# Patient Record
Sex: FEMALE | Race: WHITE | NOT HISPANIC OR LATINO | Employment: FULL TIME | URBAN - METROPOLITAN AREA
[De-identification: names, ages, dates, MRNs, and addresses within clinical notes are randomized per-mention and may not be internally consistent; named-entity substitution may affect disease eponyms.]

---
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
## 2019-02-06 ENCOUNTER — OFFICE VISIT (OUTPATIENT)
Dept: INTERNAL MEDICINE | Facility: CLINIC | Age: 19
End: 2019-02-06

## 2019-02-06 VITALS
HEART RATE: 73 BPM | HEIGHT: 63 IN | BODY MASS INDEX: 26.72 KG/M2 | TEMPERATURE: 98 F | RESPIRATION RATE: 16 BRPM | SYSTOLIC BLOOD PRESSURE: 116 MMHG | WEIGHT: 150.8 LBS | OXYGEN SATURATION: 98 % | DIASTOLIC BLOOD PRESSURE: 72 MMHG

## 2019-02-06 DIAGNOSIS — R21 PAPULAR RASH, LOCALIZED: Primary | ICD-10-CM

## 2019-02-06 PROCEDURE — 99203 OFFICE O/P NEW LOW 30 MIN: CPT | Performed by: PHYSICIAN ASSISTANT

## 2019-02-06 RX ORDER — TRIAMCINOLONE ACETONIDE 1 MG/G
CREAM TOPICAL 2 TIMES DAILY
Qty: 15 G | Refills: 1 | Status: SHIPPED | OUTPATIENT
Start: 2019-02-06 | End: 2020-12-23

## 2019-02-06 NOTE — PROGRESS NOTES
Chief Complaint   Patient presents with   • Establish Care   • Rash     Pt is c/o bumps on the insides of her elbows. She states that they do itch. She states they also appear on the tops of her ankles as well.        Subjective   Karol Leal is a 18 y.o. female presents today to establish care.  She has c/o skin rash.    History of Present Illness    Skin Rash:  Patient reports history of rash to her elbows and ankles.  This has been occurring on and off for about 5 months.  She has used OTC moisturizers and cream, which will improve the rash temporarily.  There is associated pruritis with the rash.  She denies any new lotion, laundry soaps, or other new potential contact irritant.  She denies fever, chills, nausea, or vomiting.  She initially sought out dermatology evaluation but required a referral.     Past Medical History:   Diagnosis Date   • Anxiety    • Herpes simplex complication      Past Surgical History:   Procedure Laterality Date   • ADENOIDECTOMY     • TONSILLECTOMY     • TUBAL ABDOMINAL LIGATION       Family History   Problem Relation Age of Onset   • No Known Problems Mother      Social History     Socioeconomic History   • Marital status: Single     Spouse name: Not on file   • Number of children: Not on file   • Years of education: Not on file   • Highest education level: Not on file   Social Needs   • Financial resource strain: Not on file   • Food insecurity - worry: Not on file   • Food insecurity - inability: Not on file   • Transportation needs - medical: Not on file   • Transportation needs - non-medical: Not on file   Occupational History   • Not on file   Tobacco Use   • Smoking status: Never Smoker   • Smokeless tobacco: Never Used   Substance and Sexual Activity   • Alcohol use: No     Frequency: Never   • Drug use: No   • Sexual activity: Not on file   Other Topics Concern   • Not on file   Social History Narrative   • Not on file     No Known Allergies    Review of Systems    Constitutional: Negative for chills, fatigue and fever.   Respiratory: Negative for cough and shortness of breath.    Cardiovascular: Negative for chest pain and palpitations.   Gastrointestinal: Negative for nausea and vomiting.   Musculoskeletal: Negative for arthralgias, joint swelling and myalgias.   Skin: Positive for rash. Negative for color change.   Neurological: Negative for dizziness and light-headedness.   Hematological: Negative for adenopathy.     Objective     Vitals:    02/06/19 1433   BP: 116/72   Pulse: 73   Resp: 16   Temp: 98 °F (36.7 °C)   SpO2: 98%       Physical Exam   Constitutional: She is oriented to person, place, and time. She appears well-developed and well-nourished. No distress.   HENT:   Head: Normocephalic and atraumatic.   Eyes: Conjunctivae and EOM are normal. Pupils are equal, round, and reactive to light.   Neck: Normal range of motion. Neck supple.   Cardiovascular: Normal rate, regular rhythm and normal heart sounds. Exam reveals no gallop and no friction rub.   No murmur heard.  Pulmonary/Chest: Effort normal and breath sounds normal. No respiratory distress. She has no wheezes. She has no rales.   Neurological: She is alert and oriented to person, place, and time.   Skin: Skin is warm and dry. Capillary refill takes less than 2 seconds. Turgor is normal. Rash noted. Rash is papular. Rash is not macular. She is not diaphoretic.        Psychiatric: She has a normal mood and affect. Her behavior is normal.   Nursing note and vitals reviewed.      No results found for this or any previous visit.    Assessment/Plan     Karol was seen today for establish care and rash.    Diagnoses and all orders for this visit:    Papular rash, localized  -     Start steroid cream.  Can use benadryl for itching.  Will place referral to dermatology as requested.  Have advised patient to take photos of the rash before using steroid cream to show at dermatology referral.   -     triamcinolone  (KENALOG) 0.1 % cream; Apply  topically to the appropriate area as directed 2 (Two) Times a Day.  -     Ambulatory Referral to Dermatology    Return if symptoms worsen or fail to improve, for Annual.    Savita Carroll PA-C

## 2019-10-07 ENCOUNTER — TELEPHONE (OUTPATIENT)
Dept: INTERNAL MEDICINE | Facility: CLINIC | Age: 19
End: 2019-10-07

## 2019-10-07 NOTE — TELEPHONE ENCOUNTER
Patient called and states she had a nexplanon placed over 3 years ago in [place] and needs to have it removed. She is wondering if we will do this for her since we did not place it.

## 2020-11-15 ENCOUNTER — HOSPITAL ENCOUNTER (EMERGENCY)
Facility: HOSPITAL | Age: 20
Discharge: HOME | End: 2020-11-15
Attending: STUDENT IN AN ORGANIZED HEALTH CARE EDUCATION/TRAINING PROGRAM | Admitting: STUDENT IN AN ORGANIZED HEALTH CARE EDUCATION/TRAINING PROGRAM

## 2020-11-15 VITALS
WEIGHT: 153 LBS | DIASTOLIC BLOOD PRESSURE: 83 MMHG | OXYGEN SATURATION: 99 % | RESPIRATION RATE: 14 BRPM | HEART RATE: 90 BPM | BODY MASS INDEX: 26.12 KG/M2 | TEMPERATURE: 97.9 F | HEIGHT: 64 IN | SYSTOLIC BLOOD PRESSURE: 123 MMHG

## 2020-11-15 DIAGNOSIS — B96.89 BACTERIAL VAGINOSIS: Primary | ICD-10-CM

## 2020-11-15 DIAGNOSIS — R10.2 PELVIC PAIN: ICD-10-CM

## 2020-11-15 DIAGNOSIS — N76.0 BACTERIAL VAGINOSIS: Primary | ICD-10-CM

## 2020-11-15 LAB
ALBUMIN SERPL-MCNC: 4.4 G/DL (ref 3.5–5.2)
ALBUMIN/GLOB SERPL: 1.8 G/DL
ALP SERPL-CCNC: 112 U/L (ref 39–117)
ALT SERPL W P-5'-P-CCNC: 11 U/L (ref 1–33)
ANION GAP SERPL CALCULATED.3IONS-SCNC: 9.6 MMOL/L (ref 5–15)
AST SERPL-CCNC: 18 U/L (ref 1–32)
B-HCG UR QL: NEGATIVE
BASOPHILS # BLD AUTO: 0.06 10*3/MM3 (ref 0–0.2)
BASOPHILS NFR BLD AUTO: 0.6 % (ref 0–1.5)
BILIRUB SERPL-MCNC: 0.6 MG/DL (ref 0–1.2)
BILIRUB UR QL STRIP: NEGATIVE
BUN SERPL-MCNC: 16 MG/DL (ref 6–20)
BUN/CREAT SERPL: 23.9 (ref 7–25)
CALCIUM SPEC-SCNC: 9.5 MG/DL (ref 8.6–10.5)
CHLORIDE SERPL-SCNC: 105 MMOL/L (ref 98–107)
CLARITY UR: CLEAR
CLUE CELLS SPEC QL WET PREP: ABNORMAL
CO2 SERPL-SCNC: 23.4 MMOL/L (ref 22–29)
COLOR UR: YELLOW
CREAT SERPL-MCNC: 0.67 MG/DL (ref 0.57–1)
DEPRECATED RDW RBC AUTO: 42 FL (ref 37–54)
EOSINOPHIL # BLD AUTO: 0.15 10*3/MM3 (ref 0–0.4)
EOSINOPHIL NFR BLD AUTO: 1.5 % (ref 0.3–6.2)
ERYTHROCYTE [DISTWIDTH] IN BLOOD BY AUTOMATED COUNT: 12.7 % (ref 12.3–15.4)
GFR SERPL CREATININE-BSD FRML MDRD: 111 ML/MIN/1.73
GLOBULIN UR ELPH-MCNC: 2.4 GM/DL
GLUCOSE SERPL-MCNC: 83 MG/DL (ref 65–99)
GLUCOSE UR STRIP-MCNC: NEGATIVE MG/DL
HCT VFR BLD AUTO: 40.3 % (ref 34–46.6)
HGB BLD-MCNC: 13.2 G/DL (ref 12–15.9)
HGB UR QL STRIP.AUTO: NEGATIVE
HYDATID CYST SPEC WET PREP: ABNORMAL
IMM GRANULOCYTES # BLD AUTO: 0.03 10*3/MM3 (ref 0–0.05)
IMM GRANULOCYTES NFR BLD AUTO: 0.3 % (ref 0–0.5)
KETONES UR QL STRIP: NEGATIVE
KOH PREP NAIL: NORMAL
LEUKOCYTE ESTERASE UR QL STRIP.AUTO: NEGATIVE
LIPASE SERPL-CCNC: 27 U/L (ref 13–60)
LYMPHOCYTES # BLD AUTO: 4.25 10*3/MM3 (ref 0.7–3.1)
LYMPHOCYTES NFR BLD AUTO: 43.1 % (ref 19.6–45.3)
MCH RBC QN AUTO: 29.4 PG (ref 26.6–33)
MCHC RBC AUTO-ENTMCNC: 32.8 G/DL (ref 31.5–35.7)
MCV RBC AUTO: 89.8 FL (ref 79–97)
MONOCYTES # BLD AUTO: 1.05 10*3/MM3 (ref 0.1–0.9)
MONOCYTES NFR BLD AUTO: 10.6 % (ref 5–12)
NEUTROPHILS NFR BLD AUTO: 4.32 10*3/MM3 (ref 1.7–7)
NEUTROPHILS NFR BLD AUTO: 43.9 % (ref 42.7–76)
NITRITE UR QL STRIP: NEGATIVE
NRBC BLD AUTO-RTO: 0 /100 WBC (ref 0–0.2)
PH UR STRIP.AUTO: 6.5 [PH] (ref 5–8)
PLATELET # BLD AUTO: 267 10*3/MM3 (ref 140–450)
PMV BLD AUTO: 9.3 FL (ref 6–12)
POTASSIUM SERPL-SCNC: 3.9 MMOL/L (ref 3.5–5.2)
PROT SERPL-MCNC: 6.8 G/DL (ref 6–8.5)
PROT UR QL STRIP: NEGATIVE
RBC # BLD AUTO: 4.49 10*6/MM3 (ref 3.77–5.28)
SODIUM SERPL-SCNC: 138 MMOL/L (ref 136–145)
SP GR UR STRIP: 1.03 (ref 1–1.03)
T VAGINALIS SPEC QL WET PREP: ABNORMAL
UROBILINOGEN UR QL STRIP: NORMAL
WBC # BLD AUTO: 9.86 10*3/MM3 (ref 3.4–10.8)
WBC SPEC QL WET PREP: ABNORMAL
YEAST GENITAL QL WET PREP: ABNORMAL

## 2020-11-15 PROCEDURE — 81003 URINALYSIS AUTO W/O SCOPE: CPT

## 2020-11-15 PROCEDURE — 87220 TISSUE EXAM FOR FUNGI: CPT | Performed by: PHYSICIAN ASSISTANT

## 2020-11-15 PROCEDURE — 80053 COMPREHEN METABOLIC PANEL: CPT | Performed by: PHYSICIAN ASSISTANT

## 2020-11-15 PROCEDURE — 87491 CHLMYD TRACH DNA AMP PROBE: CPT | Performed by: PHYSICIAN ASSISTANT

## 2020-11-15 PROCEDURE — 81025 URINE PREGNANCY TEST: CPT

## 2020-11-15 PROCEDURE — 99284 EMERGENCY DEPT VISIT MOD MDM: CPT

## 2020-11-15 PROCEDURE — 87210 SMEAR WET MOUNT SALINE/INK: CPT | Performed by: PHYSICIAN ASSISTANT

## 2020-11-15 PROCEDURE — 85025 COMPLETE CBC W/AUTO DIFF WBC: CPT | Performed by: PHYSICIAN ASSISTANT

## 2020-11-15 PROCEDURE — 83690 ASSAY OF LIPASE: CPT | Performed by: PHYSICIAN ASSISTANT

## 2020-11-15 PROCEDURE — 87591 N.GONORRHOEAE DNA AMP PROB: CPT | Performed by: PHYSICIAN ASSISTANT

## 2020-11-15 RX ORDER — IBUPROFEN 800 MG/1
800 TABLET ORAL ONCE
Status: COMPLETED | OUTPATIENT
Start: 2020-11-15 | End: 2020-11-15

## 2020-11-15 RX ORDER — METRONIDAZOLE 500 MG/1
500 TABLET ORAL ONCE
Status: COMPLETED | OUTPATIENT
Start: 2020-11-15 | End: 2020-11-15

## 2020-11-15 RX ORDER — METRONIDAZOLE 500 MG/1
500 TABLET ORAL 2 TIMES DAILY
Qty: 14 TABLET | Refills: 0 | Status: SHIPPED | OUTPATIENT
Start: 2020-11-15 | End: 2020-11-22

## 2020-11-15 RX ORDER — IBUPROFEN 600 MG/1
600 TABLET ORAL EVERY 8 HOURS PRN
Qty: 15 TABLET | Refills: 0 | Status: SHIPPED | OUTPATIENT
Start: 2020-11-15 | End: 2020-11-20

## 2020-11-15 RX ORDER — ONDANSETRON 4 MG/1
4 TABLET, ORALLY DISINTEGRATING ORAL EVERY 6 HOURS PRN
Qty: 8 TABLET | Refills: 0 | Status: SHIPPED | OUTPATIENT
Start: 2020-11-15 | End: 2020-11-17

## 2020-11-15 RX ADMIN — METRONIDAZOLE 500 MG: 500 TABLET ORAL at 20:57

## 2020-11-15 RX ADMIN — IBUPROFEN 800 MG: 800 TABLET ORAL at 20:57

## 2020-11-16 NOTE — ED PROVIDER NOTES
Subjective   Patient is a 20-year-old female with a history of herpes simplex and anxiety presenting to the ER for evaluation of lower abdominal and pelvic pain.  Patient states the pain is been ongoing for approximately weeks.  She states that she had a yellow discharge that is abnormal for her.  She states she has had some itching in the area.  She states that she is currently sexually active she states she has had some dull pain in that area that comes and goes, thought she was about to start her menstrual cycle.  States she is felt a bit of nausea but denies any fever, emesis, diarrhea, dysuria, hematuria.  She states she used to see an OB/GYN at  but has recently become uninsured.  She denies any chest pain, cough, shortness of breath, recent antibiotic use, known sick contacts.          Review of Systems   Constitutional: Negative for chills and fever.   HENT: Negative.    Eyes: Negative.    Respiratory: Negative.    Cardiovascular: Negative.    Gastrointestinal: Positive for abdominal pain. Negative for diarrhea, nausea and vomiting.   Genitourinary: Positive for pelvic pain and vaginal discharge. Negative for dysuria, flank pain and hematuria.   Musculoskeletal: Negative.    Skin: Negative.    Allergic/Immunologic: Negative for immunocompromised state.   Neurological: Negative.    Psychiatric/Behavioral: Negative.        Past Medical History:   Diagnosis Date   • Anxiety    • Herpes simplex complication        No Known Allergies    Past Surgical History:   Procedure Laterality Date   • ADENOIDECTOMY     • TONSILLECTOMY     • TUBAL ABDOMINAL LIGATION         Family History   Problem Relation Age of Onset   • No Known Problems Mother        Social History     Socioeconomic History   • Marital status: Single     Spouse name: Not on file   • Number of children: Not on file   • Years of education: Not on file   • Highest education level: Not on file   Tobacco Use   • Smoking status: Never Smoker   • Smokeless  "tobacco: Never Used   Substance and Sexual Activity   • Alcohol use: No     Frequency: Never   • Drug use: No           Objective   Physical Exam  Vitals signs and nursing note reviewed.     /83   Pulse 90   Temp 97.9 °F (36.6 °C) (Oral)   Resp 14   Ht 162.6 cm (64\")   Wt 69.4 kg (153 lb)   LMP 10/25/2020   SpO2 99%   BMI 26.26 kg/m²     GEN: No acute distress, sitting upright in stretcher.  Awake and alert  Head: Normocephalic, atraumatic  Eyes: EOM intact  ENT: Mask in place per protocol   Cardiovascular: Regular rate and rhythm  Lungs: Clear to auscultation bilaterally  Abdomen: Abdomen is nontender, no focal guarding, rigidity, bowel sounds present.  : External genitalia unremarkable.  Patient did have some clear drainage from the cervix, no inflammation or erythema.  No other obvious lesions noted  Extremities: No edema, normal appearance  Neuro: GCS 15  Psych: Mood and affect are appropriate    Procedures           ED Course  ED Course as of Nov 16 0027   Sun Nov 15, 2020   1944 Pelvic exam chaperoned by patient care [facility]    [LA]   1955 Clue Cells, Wet Prep(!): 1+ Clue cells seen [LA]   1955 BHARGAV Prep: No yeast or hyphal elements seen [LA]   1955 Lipase: 27 [LA]   1955 Glucose: 83 [LA]   1955 Creatinine: 0.67 [LA]   1955 Sodium: 138 [LA]   1955 Potassium: 3.9 [LA]   1955 Chloride: 105 [LA]   1955 CO2: 23.4 [LA]   1955 Calcium: 9.5 [LA]   1955 Total Protein: 6.8 [LA]   1955 Albumin: 4.40 [LA]   1955 ALT (SGPT): 11 [LA]   1955 AST (SGOT): 18 [LA]   1955 Alkaline Phosphatase: 112 [LA]   1955 Total Bilirubin: 0.6 [LA]   1955 eGFR Non  Am: 111 [LA]   2030 Discussed all findings with the patient.  Did discuss obtaining ultrasound or CT scan but she declined at this time and states that she will try the antibiotics and will follow up with an OB/GYN and primary care.  She states she has a primary care provider appointment this Wednesday.  Discussed very strict return precautions.  She " verbalized understanding was in agreement with this plan of care    [LA]      ED Course User Index  [LA] Alana Leigh PA-C                                           MDM  Number of Diagnoses or Management Options  Bacterial vaginosis:   Pelvic pain:   Diagnosis management comments: On Arrival, patient is stable, afebrile, no acute distress.  Abdominal exam is benign, no focal guarding rigidity.  Differential includes ectopic pregnancy, ovarian cyst, PID, STD, UTI, and other concerns.  I have low concern for ovarian torsion, low concern for appendicitis, tubo-ovarian abscess.  Patient states she uninsured and is very hesitant about obtaining any imaging unless absolutely necessary.  Will obtain basic labs, send UA and urinalysis and obtain a pelvic exam.    Urinalysis showed no signs of infection, UPT was negative.  Basic labs showed no signs of infection or obvious abnormalities.  Pelvic exam is benign, no cervical motion tenderness.  Gonorrhea and Chlamydia was sent to lab, patient did have clue cells indicative of a bacterial vaginosis.  Will treat with antibiotics and anti-inflammatories.  I discussed these findings with the patient and did offer ultrasound but she declined and states she will follow-up with OB/GYN.  I discussed very strict return precautions.  She verbalized understanding was agreeable with this plan of care.       Amount and/or Complexity of Data Reviewed  Clinical lab tests: reviewed and ordered  Review and summarize past medical records: yes  Discuss the patient with other providers: yes    Risk of Complications, Morbidity, and/or Mortality  Presenting problems: moderate  Diagnostic procedures: moderate  Management options: low    Patient Progress  Patient progress: stable      Final diagnoses:   Bacterial vaginosis   Pelvic pain            Alana Leigh PA-C  11/16/20 0027

## 2020-11-16 NOTE — DISCHARGE INSTRUCTIONS
You have bacterial overgrowth which is likely causing your pain discharge.  Take Flagyl as directed till medication is complete to help clear this up.  Drink plenty of fluids to stay well-hydrated.  Use ibuprofen as directed to help with pain.  You will need to follow-up with your primary care provider as scheduled Wednesday.  You are likely going to need to follow-up with an OB/GYN as well and may contact Dr. Barahona.  They may need to do further images if pain is persistent.  Return to the ER for any change, worsening symptoms, or any additional concerns including but not limited to severe or worsening pain, intractable vomiting, fever > 100.4.

## 2020-11-19 LAB
C TRACH RRNA SPEC QL NAA+PROBE: POSITIVE
N GONORRHOEA RRNA SPEC QL NAA+PROBE: NEGATIVE

## 2020-11-20 ENCOUNTER — TELEPHONE (OUTPATIENT)
Dept: EMERGENCY DEPT | Facility: HOSPITAL | Age: 20
End: 2020-11-20

## 2020-11-22 ENCOUNTER — TELEPHONE (OUTPATIENT)
Dept: EMERGENCY DEPT | Facility: HOSPITAL | Age: 20
End: 2020-11-22

## 2020-11-26 ENCOUNTER — TELEPHONE (OUTPATIENT)
Dept: EMERGENCY DEPT | Facility: HOSPITAL | Age: 20
End: 2020-11-26

## 2020-12-23 ENCOUNTER — OFFICE VISIT (OUTPATIENT)
Dept: INTERNAL MEDICINE | Facility: CLINIC | Age: 20
End: 2020-12-23

## 2020-12-23 VITALS
WEIGHT: 149 LBS | TEMPERATURE: 98.2 F | HEART RATE: 91 BPM | BODY MASS INDEX: 25.44 KG/M2 | HEIGHT: 64 IN | DIASTOLIC BLOOD PRESSURE: 70 MMHG | OXYGEN SATURATION: 99 % | SYSTOLIC BLOOD PRESSURE: 118 MMHG

## 2020-12-23 DIAGNOSIS — B00.9 HSV (HERPES SIMPLEX VIRUS) INFECTION: ICD-10-CM

## 2020-12-23 DIAGNOSIS — K13.70 LESION OF ORAL MUCOSA: Primary | ICD-10-CM

## 2020-12-23 DIAGNOSIS — A74.9 CHLAMYDIA: ICD-10-CM

## 2020-12-23 PROCEDURE — 99214 OFFICE O/P EST MOD 30 MIN: CPT | Performed by: NURSE PRACTITIONER

## 2020-12-23 RX ORDER — ACYCLOVIR 400 MG/1
400 TABLET ORAL
Qty: 25 TABLET | Refills: 1 | Status: SHIPPED | OUTPATIENT
Start: 2020-12-23 | End: 2021-02-15

## 2020-12-23 RX ORDER — ACYCLOVIR 400 MG/1
TABLET ORAL
COMMUNITY
Start: 2020-10-14 | End: 2020-12-23 | Stop reason: SDUPTHER

## 2020-12-23 RX ORDER — DOXYCYCLINE HYCLATE 100 MG
100 TABLET ORAL 2 TIMES DAILY
COMMUNITY
Start: 2020-11-27 | End: 2020-12-23

## 2020-12-23 NOTE — PROGRESS NOTES
Date: 2020    Name: Karol Leal  : 2000    Chief Complaint:   Chief Complaint   Patient presents with   • Sore Throat     sore throat x 4 days       HPI:  Karol Leal is a 20 y.o. female presents with a sore throat that has been present for 4 days.  She was evaluated at a local urgent treatment center, tested for strep.  Results negative, per patient.  She is unable to recall if flu or Covid testing was done at that time.  She reports she has oral HSV, frequently has outbreaks.  Feels lesions in the back of her throat are herpetic in nature.  States she was told at urgent treatment center that primary care would have to be the one to refill her acyclovir to treat oral lesions.  Denies fever, chills, loss of smell or taste, nasal congestion, headache, dizziness, earache, cough, shortness of breath, palpitations, rash, genital lesions, abdominal pain, nausea, vomiting, diarrhea.  Patient states she drinks a lot of water in an effort to prevent HSV outbreaks.  She believes eating chocolate recently is the cause of oral lesions and sore throat.  She has been using salt water gargles, vinegar gargles without improvement of symptoms.  Seen at [facility] ED on 11/15/2020.  Chlamydia testing was positive, prescribed doxycycline 100 mg twice daily for 14 days.  Patient states she has completed prescription. However, when asked what antibiotic she was taking, states it was amoxicillin for sore throat.      History:  The following portions of the patient's history were reviewed and updated as appropriate: allergies, current medications, past medical history, family history, surgical history, social history and problem list.     ROS:  Review of Systems   Constitutional: Negative.    HENT: Negative for congestion, ear pain, facial swelling, postnasal drip, sneezing, trouble swallowing and voice change.    Eyes: Negative.    Respiratory: Negative for cough, shortness of breath and wheezing.    Cardiovascular: Negative  "for chest pain and palpitations.   Genitourinary: Negative for difficulty urinating, dysuria, genital sores, hematuria, pelvic pressure and urgency.   Neurological: Negative for dizziness and headache.       VS:  Vitals:    12/23/20 1327   BP: 118/70   Pulse: 91   Temp: 98.2 °F (36.8 °C)   TempSrc: Infrared   SpO2: 99%   Weight: 67.6 kg (149 lb)   Height: 162.6 cm (64\")     Body mass index is 25.58 kg/m².    PE:  Physical Exam  Constitutional:       Appearance: She is not ill-appearing.   HENT:      Head: Normocephalic.      Right Ear: External ear normal.      Left Ear: External ear normal.      Mouth/Throat:      Mouth: Mucous membranes are moist.      Tongue: No lesions.      Pharynx: Uvula midline.      Comments: Posterior oropharyngeal vesicles, filled with clear fluid. Base of vesicles erythematous, otherwise no erythema, edema, PND  Eyes:      Conjunctiva/sclera: Conjunctivae normal.      Pupils: Pupils are equal, round, and reactive to light.   Neck:      Musculoskeletal: Normal range of motion and neck supple.   Cardiovascular:      Rate and Rhythm: Normal rate and regular rhythm.      Pulses: Normal pulses.      Heart sounds: Normal heart sounds.   Pulmonary:      Effort: Pulmonary effort is normal.      Breath sounds: Normal breath sounds.   Abdominal:      General: There is no distension.      Tenderness: There is no abdominal tenderness.   Lymphadenopathy:      Cervical: No cervical adenopathy.   Skin:     General: Skin is warm.      Capillary Refill: Capillary refill takes less than 2 seconds.   Neurological:      Mental Status: She is alert and oriented to person, place, and time.      Coordination: Coordination normal.      Gait: Gait normal.   Psychiatric:         Mood and Affect: Mood is anxious.         Speech: Speech normal.         Assessment/Plan:  Diagnoses and all orders for this visit:    1. Lesion of oral mucosa (Primary)  -     acyclovir (ZOVIRAX) 400 MG tablet; Take 1 tablet by mouth " Every 4 (Four) Hours While Awake. Take no more than 5 doses a day.  Dispense: 25 tablet; Refill: 1  - Advised to stay well rested, avoid stress, food/drink that irritate oral mucosa    2. HSV (herpes simplex virus) infection  -     acyclovir (ZOVIRAX) 400 MG tablet; Take 1 tablet by mouth Every 4 (Four) Hours While Awake. Take no more than 5 doses a day.  Dispense: 25 tablet; Refill: 1    3. Chlamydia        - Return in 4 weeks for repeat chlamydia testing.        - Advised to advise sexual partners of diagnosis in order for them to be treated.        Return in 4 weeks (on 1/20/2021) for Annual.

## 2021-02-13 DIAGNOSIS — K13.70 LESION OF ORAL MUCOSA: ICD-10-CM

## 2021-02-13 DIAGNOSIS — B00.9 HSV (HERPES SIMPLEX VIRUS) INFECTION: ICD-10-CM

## 2021-02-15 RX ORDER — ACYCLOVIR 400 MG/1
TABLET ORAL
Qty: 25 TABLET | Refills: 0 | Status: SHIPPED | OUTPATIENT
Start: 2021-02-15 | End: 2021-05-05

## 2021-03-04 ENCOUNTER — OFFICE VISIT (OUTPATIENT)
Dept: OBSTETRICS AND GYNECOLOGY | Facility: CLINIC | Age: 21
End: 2021-03-04

## 2021-03-04 VITALS
DIASTOLIC BLOOD PRESSURE: 74 MMHG | WEIGHT: 151 LBS | HEIGHT: 64 IN | SYSTOLIC BLOOD PRESSURE: 110 MMHG | BODY MASS INDEX: 25.78 KG/M2

## 2021-03-04 DIAGNOSIS — Z20.2 CHLAMYDIA CONTACT: ICD-10-CM

## 2021-03-04 DIAGNOSIS — Z30.46 NEXPLANON REMOVAL: ICD-10-CM

## 2021-03-04 DIAGNOSIS — B00.9 HSV INFECTION: ICD-10-CM

## 2021-03-04 DIAGNOSIS — Z11.3 ROUTINE SCREENING FOR STI (SEXUALLY TRANSMITTED INFECTION): ICD-10-CM

## 2021-03-04 DIAGNOSIS — Z30.09 GENERAL COUNSELING AND ADVICE ON FEMALE CONTRACEPTION: Primary | ICD-10-CM

## 2021-03-04 PROCEDURE — 99203 OFFICE O/P NEW LOW 30 MIN: CPT | Performed by: OBSTETRICS & GYNECOLOGY

## 2021-03-04 PROCEDURE — 11982 REMOVE DRUG IMPLANT DEVICE: CPT | Performed by: OBSTETRICS & GYNECOLOGY

## 2021-03-06 LAB
A VAGINAE DNA VAG QL NAA+PROBE: NORMAL SCORE
BVAB2 DNA VAG QL NAA+PROBE: NORMAL SCORE
C ALBICANS DNA VAG QL NAA+PROBE: NEGATIVE
C GLABRATA DNA VAG QL NAA+PROBE: NEGATIVE
C TRACH DNA VAG QL NAA+PROBE: NEGATIVE
MEGA1 DNA VAG QL NAA+PROBE: NORMAL SCORE
N GONORRHOEA DNA VAG QL NAA+PROBE: NEGATIVE
T VAGINALIS DNA VAG QL NAA+PROBE: NEGATIVE

## 2021-03-17 PROBLEM — A74.9 CHLAMYDIA: Status: ACTIVE | Noted: 2021-03-17

## 2021-03-17 PROBLEM — B00.9 HSV INFECTION: Status: ACTIVE | Noted: 2021-03-17

## 2021-03-17 NOTE — PROGRESS NOTES
"GYN Office Visit    Subjective   Chief Complaint   Patient presents with   • Nexplanon Removal     Wants Nexplanon removed, arm is painful and irregular bleeding.     Karol Leal is a 20 y.o. year old  presenting to be seen for Nexplanon removal.    She would like her Nexplanon removed today secondary to arm discomfort and irregular bleeding.  She has had the device for roughly 1 year at this point.  This is her second device.  She had the first device for the full duration.  History of HSV.  History of chlamydia in 2020.  She would like STI screening today.  She is not interested in contraception.  Previous left salpingectomy for ectopic pregnancy.    OB Hx: 1 ectopic pregnancy    Past Medical History:   Diagnosis Date   • Anxiety    • Chlamydia    • Herpes simplex complication        Past Surgical History:   Procedure Laterality Date   • ADENOIDECTOMY     • TONSILLECTOMY     • TUBAL ABDOMINAL LIGATION         Family History   Problem Relation Age of Onset   • Breast cancer Mother    • Prostate cancer Paternal Grandfather    • Breast cancer Maternal Aunt         Social History     Tobacco Use   • Smoking status: Never Smoker   • Smokeless tobacco: Never Used   Vaping Use   • Vaping Use: Never used   Substance Use Topics   • Alcohol use: Yes     Comment: occ.   • Drug use: No       (Not in a hospital admission)      Patient has no known allergies.    Current Outpatient Medications on File Prior to Visit   Medication Sig Dispense Refill   • acyclovir (ZOVIRAX) 400 MG tablet TAKE 1 TABLET BY MOUTH EVERY FOUR HOURS while awake. take no more than 5 doses per day  25 tablet 0     No current facility-administered medications on file prior to visit.       Social History    Tobacco Use      Smoking status: Never Smoker      Smokeless tobacco: Never Used         Objective   /74   Ht 162.6 cm (64\")   Wt 68.5 kg (151 lb)   BMI 25.92 kg/m²     Physical Exam:  General Appearance: alert, pleasant, " appears stated age, interactive and cooperative  Breasts: Not performed.  Abdomen: no masses, no hepatomegaly, no splenomegaly, soft non-tender, no guarding and no rebound tenderness  Pelvis:  Pelvic: Clinical staff was present for exam  External genitalia:  normal appearance of the external genitalia including Bartholin's and [place]'s glands.  :  urethral meatus normal;  Vagina:  normal pink mucosa without prolapse or lesions.  Cervix:  normal appearance.  Uterus:  normal size, shape and consistency.  Adnexa:  normal bimanual exam of the adnexa.  Rectal:  digital rectal exam not performed; anus visually normal appearing.         Medical Decision Making:    Assessment   Encounter for Nexplanon removal  General counseling regarding contraception  History of HSV and chlamydia  Encounter for sexually transmitted infection screening     Plan    Orders Placed This Encounter   Procedures   • NuSwab VG+ - Swab, Cervix     Order Specific Question:   [facility] Has the patient fasted?     Answer:   No       Medication Management: none    Procedures Performed: Nexplanon removal    We reviewed various options for alternate contraception today.  She strongly desires to have the Nexplanon removed.  Device was removed as detailed below.  She declines hormonal contraception at this time.  Vaginal cultures were obtained to screen for sexually transmitted infections.  We briefly discussed daily suppression for HSV.    Nexplanon Removal     No LMP recorded. Patient has had an implant.    Date of procedure:  03/04/2021    Risks and benefits discussed? yes  All questions answered? yes  Consents given by the patient  Written consent obtained? yes    Local anesthesia used:  yes - 2 cc's of  Meds; anesthesia local: 1% lidocaine with epinephrine    Procedure documentation:    The upper left arm (non-dominant) was marked at the intended site of removal. An alcohol wipe was used to cleanse the skin.  Local anesthesia was injected. The arm was  further cleaned with betadine. A vertical incision was created at the distal tip of the implant.  The implant was removed intact without difficulty.    Steri-strips were then placed across the site of insertion and the arm was wrapped.    She tolerated the procedure well.  There were no complications.  EBL was minimal.    Post procedure instructions: Remove the wrapping in 24 hours and the steri-strips in 5 days.  The patient has been advised to contact the office if she develops fever, redness, swelling, pain, or discharge occurs at the procedure site.      Eleuterio Marie MD  Obstetrics and Gynecology  [facility]

## 2021-05-04 DIAGNOSIS — B00.9 HSV (HERPES SIMPLEX VIRUS) INFECTION: ICD-10-CM

## 2021-05-04 DIAGNOSIS — K13.70 LESION OF ORAL MUCOSA: ICD-10-CM

## 2021-05-05 ENCOUNTER — OFFICE VISIT (OUTPATIENT)
Dept: OBSTETRICS AND GYNECOLOGY | Facility: CLINIC | Age: 21
End: 2021-05-05

## 2021-05-05 VITALS
WEIGHT: 156 LBS | BODY MASS INDEX: 26.63 KG/M2 | HEIGHT: 64 IN | DIASTOLIC BLOOD PRESSURE: 72 MMHG | SYSTOLIC BLOOD PRESSURE: 122 MMHG

## 2021-05-05 DIAGNOSIS — K13.70 LESION OF ORAL MUCOSA: ICD-10-CM

## 2021-05-05 DIAGNOSIS — B00.9 HSV (HERPES SIMPLEX VIRUS) INFECTION: ICD-10-CM

## 2021-05-05 DIAGNOSIS — A74.9 CHLAMYDIA: ICD-10-CM

## 2021-05-05 DIAGNOSIS — Z11.3 ROUTINE SCREENING FOR STI (SEXUALLY TRANSMITTED INFECTION): Primary | ICD-10-CM

## 2021-05-05 PROCEDURE — 99213 OFFICE O/P EST LOW 20 MIN: CPT | Performed by: OBSTETRICS & GYNECOLOGY

## 2021-05-05 RX ORDER — ACYCLOVIR 400 MG/1
TABLET ORAL
Qty: 25 TABLET | Refills: 0 | Status: SHIPPED | OUTPATIENT
Start: 2021-05-05 | End: 2021-05-05

## 2021-05-05 RX ORDER — VALACYCLOVIR HYDROCHLORIDE 1 G/1
1000 TABLET, FILM COATED ORAL DAILY
Qty: 30 TABLET | Refills: 12 | Status: SHIPPED | OUTPATIENT
Start: 2021-05-05

## 2021-05-06 NOTE — PROGRESS NOTES
"GYN Office Visit  Subjective   Chief Complaint   Patient presents with   • Follow-up     Patient states she was treated for Chlamydia a couple months ago, still having pain in lower abdomen, vaginal discharge.       Karol Leal is a 20 y.o. year old  presenting to be seen for STI screening.    She recently had a chlamydia infection that was treated.  Most recent cultures were negative.  She recently had sex with the person who gave her chlamydia the first time.  No protection.  She has mild discomfort in her pelvis now which is similar to how she felt when she had chlamydia.  No other symptoms.  She continues to decline birth control.       Objective   /72   Ht 162.6 cm (64\")   Wt 70.8 kg (156 lb)   LMP 2021   Breastfeeding No   BMI 26.78 kg/m²     Physical Exam:  Reassuring speculum and bimanual exam.  No cervical motion tenderness.     Assessment   Screening for sexually transmitted infections  Pelvic discomfort  History of chlamydia, treated  History of HSV    Vaginal cultures were obtained today.  We will follow-up results by phone and discuss treatment if needed.  I again encouraged contraception, but patient declines at this time.  I did provide a recurrent prescription for Valtrex to address HSV outbreaks as needed.  All questions answered.    Coding/billing based on time: 20 total minutes were required for this encounter.    Eleuterio Marie MD  Obstetrics and Gynecology  [facility]  "

## 2021-06-17 ENCOUNTER — OFFICE VISIT (OUTPATIENT)
Dept: OBSTETRICS AND GYNECOLOGY | Facility: CLINIC | Age: 21
End: 2021-06-17

## 2021-06-17 VITALS
BODY MASS INDEX: 26.63 KG/M2 | DIASTOLIC BLOOD PRESSURE: 78 MMHG | SYSTOLIC BLOOD PRESSURE: 118 MMHG | WEIGHT: 156 LBS | HEIGHT: 64 IN

## 2021-06-17 DIAGNOSIS — Z12.4 SCREENING FOR MALIGNANT NEOPLASM OF CERVIX: ICD-10-CM

## 2021-06-17 DIAGNOSIS — Z01.419 WELL WOMAN EXAM WITH ROUTINE GYNECOLOGICAL EXAM: Primary | ICD-10-CM

## 2021-06-17 DIAGNOSIS — Z30.017 ENCOUNTER FOR INITIAL PRESCRIPTION OF NEXPLANON: ICD-10-CM

## 2021-06-17 DIAGNOSIS — Z30.09 GENERAL COUNSELING AND ADVICE ON FEMALE CONTRACEPTION: ICD-10-CM

## 2021-06-17 PROCEDURE — 99395 PREV VISIT EST AGE 18-39: CPT | Performed by: OBSTETRICS & GYNECOLOGY

## 2021-06-21 NOTE — PROGRESS NOTES
"Annual Well Woman Visit    Subjective   Chief Complaint   Patient presents with   • Gynecologic Exam     Annual and pap. Patient would like to discuss nexplanon     Karol Leal is a 20 y.o. year old  presenting to be seen for an annual well woman visit.    She would like to discuss getting a Nexplanon again.  No other issues.    She denies sexual dysfunction. She denies urinary complaints including incontinence.    Past Medical History:   Diagnosis Date   • Anxiety    • Chlamydia    • Herpes simplex complication      Past Surgical History:   Procedure Laterality Date   • ADENOIDECTOMY     • TONSILLECTOMY     • TUBAL ABDOMINAL LIGATION       Family History   Problem Relation Age of Onset   • Breast cancer Mother    • Prostate cancer Paternal Grandfather    • Breast cancer Maternal Aunt      Social History     Tobacco Use   • Smoking status: Never Smoker   • Smokeless tobacco: Never Used   Vaping Use   • Vaping Use: Never used   Substance Use Topics   • Alcohol use: Yes     Comment: occ.   • Drug use: No     (Not in a hospital admission)    Patient has no known allergies.  Current Outpatient Medications on File Prior to Visit   Medication Sig Dispense Refill   • valACYclovir (Valtrex) 1000 MG tablet Take 1 tablet by mouth Daily. 30 tablet 12     No current facility-administered medications on file prior to visit.     Social History    Tobacco Use      Smoking status: Never Smoker      Smokeless tobacco: Never Used      Review of Systems  Pertinent items are noted in HPI, all other systems were reviewed and negative       Objective   /78   Ht 162.6 cm (64\")   Wt 70.8 kg (156 lb)   LMP 2021   Breastfeeding No   BMI 26.78 kg/m²     Physical Exam:  General Appearance: alert, pleasant, appears stated age, interactive and cooperative  Breasts: Not performed.  Abdomen: no masses, no hepatomegaly, no splenomegaly, soft non-tender, no guarding and no rebound tenderness    Pelvis:  Pelvic: Clinical " staff was present for exam  External genitalia:  normal appearance of the external genitalia including Bartholin's and [place]'s glands.  :  urethral meatus normal;  Vagina:  normal pink mucosa without prolapse or lesions.  Cervix:  normal appearance.  Uterus:  normal size, shape and consistency.  Adnexa:  normal bimanual exam of the adnexa.  Rectal:  digital rectal exam not performed; anus visually normal appearing.       Assessment   Annual well woman exam with age appropriate screening  General counseling regarding contraception     Plan    No orders of the defined types were placed in this encounter.    Medications ordered: Nexplanon    Procedures performed: Pap smear    - Mammogram: Not indicated  - Pap screening guidelines reviewed; pap smear collected today  - Yearly clinical breast and pelvic exams recommended regardless of pap recommendations  - Dexa scan: not indicated   - Colonoscopy: not indicated   - Healthy diet and exercise encouraged  - Calcium and Vitamin D requirements reviewed  - Contraception: We reviewed various options for birth control with emphasis on Nexplanon today.  She does desire to have a Nexplanon replaced.  That device was ordered today and she will return for placement in the near future.  - Screening: None    Follow up for annual visits    Eleuterio Marie MD  Obstetrics and Gynecology  [facility]

## 2021-06-29 DIAGNOSIS — Z12.4 SCREENING FOR MALIGNANT NEOPLASM OF CERVIX: ICD-10-CM

## 2024-09-01 ENCOUNTER — HOSPITAL ENCOUNTER (EMERGENCY)
Facility: HOSPITAL | Age: 24
Discharge: HOME | End: 2024-09-01
Attending: EMERGENCY MEDICINE | Admitting: EMERGENCY MEDICINE
Payer: MEDICAID

## 2024-09-01 ENCOUNTER — APPOINTMENT (OUTPATIENT)
Dept: GENERAL RADIOLOGY | Facility: HOSPITAL | Age: 24
End: 2024-09-01
Payer: MEDICAID

## 2024-09-01 VITALS
RESPIRATION RATE: 16 BRPM | HEIGHT: 64 IN | BODY MASS INDEX: 27.49 KG/M2 | WEIGHT: 161 LBS | TEMPERATURE: 98.2 F | SYSTOLIC BLOOD PRESSURE: 136 MMHG | DIASTOLIC BLOOD PRESSURE: 86 MMHG | OXYGEN SATURATION: 97 % | HEART RATE: 75 BPM

## 2024-09-01 DIAGNOSIS — S83.92XA SPRAIN OF LEFT KNEE, UNSPECIFIED LIGAMENT, INITIAL ENCOUNTER: Primary | ICD-10-CM

## 2024-09-01 PROCEDURE — 99283 EMERGENCY DEPT VISIT LOW MDM: CPT

## 2024-09-01 PROCEDURE — 73560 X-RAY EXAM OF KNEE 1 OR 2: CPT

## 2024-09-01 NOTE — ED PROVIDER NOTES
Subjective   History of Present Illness  Pt is a 22 yo female presenting to ED with complaints of knee pain. PMHx significant for Anxiety and STDs. Pt explains yesterday was jogging and felt a pop in left knee. She had sudden pain. She has difficulty straightening her leg and pain with ambulation. She reports several years ago injured the left knee and had xrays and wore a brace but never f/u with Ortho. No hx of prior MRI. Denies numbness or weakness to LE. Denies any other complaints. She uses ETOH occasionally and denies tobacco use.    History provided by:  Patient, medical records and parent      Review of Systems   Musculoskeletal:  Positive for arthralgias and joint swelling. Negative for back pain and neck pain.   Skin:  Negative for color change and wound.   Neurological:  Negative for weakness and numbness.       Past Medical History:   Diagnosis Date    Anxiety     Chlamydia     Herpes simplex complication        No Known Allergies    Past Surgical History:   Procedure Laterality Date    ADENOIDECTOMY      TONSILLECTOMY      TUBAL ABDOMINAL LIGATION         Family History   Problem Relation Age of Onset    Breast cancer Mother     Prostate cancer Paternal Grandfather     Breast cancer Maternal Aunt        Social History     Socioeconomic History    Marital status: Single   Tobacco Use    Smoking status: Never    Smokeless tobacco: Never   Vaping Use    Vaping status: Never Used   Substance and Sexual Activity    Alcohol use: Yes     Comment: occ.    Drug use: No    Sexual activity: Not Currently     Partners: Male     Birth control/protection: Implant           Objective   Physical Exam  Vitals and nursing note reviewed.   Constitutional:       General: She is not in acute distress.  HENT:      Head: Atraumatic.   Cardiovascular:      Rate and Rhythm: Normal rate.      Pulses: Normal pulses.   Pulmonary:      Effort: Pulmonary effort is normal. No respiratory distress.   Musculoskeletal:         General:  "Normal range of motion.      Cervical back: Normal range of motion and neck supple.      Left knee: Swelling present. No deformity, effusion, erythema or ecchymosis. Decreased range of motion. Tenderness present.      Left foot: Normal capillary refill. Normal pulse.   Skin:     General: Skin is warm.      Capillary Refill: Capillary refill takes less than 2 seconds.   Neurological:      General: No focal deficit present.      Mental Status: She is alert.      Sensory: No sensory deficit.      Motor: No weakness.   Psychiatric:         Mood and Affect: Mood normal.         Behavior: Behavior normal.         Procedures           ED Course    No results found for this or any previous visit (from the past 24 hour(s)).  Note: In addition to lab results from this visit, the labs listed above may include labs taken at another facility or during a different encounter within the last 24 hours. Please correlate lab times with ED admission and discharge times for further clarification of the services performed during this visit.    XR Knee 1 or 2 View Left   Final Result   Impression:   No acute abnormality of the left knee.         Electronically Signed: Curtis Gregory MD     9/1/2024 3:17 PM EDT     Workstation ID: [id number]        Vitals:    09/01/24 1355   BP: 136/86   BP Location: Left arm   Patient Position: Sitting   Pulse: 75   Resp: 16   Temp: 98.2 °F (36.8 °C)   TempSrc: Oral   SpO2: 97%   Weight: 73 kg (161 lb)   Height: 162.6 cm (64\")     Medications - No data to display  ECG/EMG Results (last 24 hours)       ** No results found for the last 24 hours. **          No orders to display                                                Medical Decision Making  Pt is a 24 yo female presenting to ED with complaints of knee pain. Xray of left knee without acute findings in ED. Placed in knee immobilizer and offered crutches in ED. Discussed ice, elevation and NSAIDs. Discussed f/u with PCP and Ortho. She is agreeable with " plan.     DDx  Fracture, Sprain, Contusion, Dislocation, Ligament injury     Problems Addressed:  Sprain of left knee, unspecified ligament, initial encounter: complicated acute illness or injury    Amount and/or Complexity of Data Reviewed  External Data Reviewed: notes.     Details: Reviewed previous non ED visits including prior labs, imaging, available notes, medications, allergies and surgical hx.     Radiology: ordered. Decision-making details documented in ED Course.        Final diagnoses:   Sprain of left knee, unspecified ligament, initial encounter       ED Disposition  ED Disposition       ED Disposition   Discharge    Condition   Stable    Comment   --               Curtis Oconnor Jr., MD  [address]  [address]  [place] [address]  [phone]    Schedule an appointment as soon as possible for a visit       [facility] - [place] [address]  [phone]    Call and establish a primary care doctor.         Medication List      No changes were made to your prescriptions during this visit.            Renetta Navarro PA  09/01/24 1549

## 2024-09-01 NOTE — Clinical Note
[facility] EMERGENCY DEPARTMENT  [address]  [facility] [address]  Phone: [phone]    Karol Leal was seen and treated in our emergency department on 9/1/2024.  She may return to work on 09/03/2024.         Thank you for choosing [facility].    Ernestina Beaver RN